# Patient Record
Sex: FEMALE | Race: WHITE | NOT HISPANIC OR LATINO | Employment: FULL TIME | URBAN - METROPOLITAN AREA
[De-identification: names, ages, dates, MRNs, and addresses within clinical notes are randomized per-mention and may not be internally consistent; named-entity substitution may affect disease eponyms.]

---

## 2017-02-14 ENCOUNTER — OFFICE VISIT (OUTPATIENT)
Dept: URGENT CARE | Facility: CLINIC | Age: 47
End: 2017-02-14
Payer: COMMERCIAL

## 2017-02-14 VITALS
SYSTOLIC BLOOD PRESSURE: 130 MMHG | BODY MASS INDEX: 40.65 KG/M2 | DIASTOLIC BLOOD PRESSURE: 84 MMHG | TEMPERATURE: 97.9 F | HEIGHT: 67 IN | HEART RATE: 80 BPM | WEIGHT: 259 LBS | OXYGEN SATURATION: 97 % | RESPIRATION RATE: 16 BRPM

## 2017-02-14 DIAGNOSIS — J06.9 UPPER RESPIRATORY TRACT INFECTION, UNSPECIFIED TYPE: ICD-10-CM

## 2017-02-14 PROCEDURE — 99203 OFFICE O/P NEW LOW 30 MIN: CPT | Performed by: PHYSICIAN ASSISTANT

## 2017-02-14 RX ORDER — DULOXETIN HYDROCHLORIDE 60 MG/1
60 CAPSULE, DELAYED RELEASE ORAL DAILY
COMMUNITY

## 2017-02-14 RX ORDER — CARVEDILOL 25 MG/1
25 TABLET ORAL 2 TIMES DAILY WITH MEALS
COMMUNITY

## 2017-02-14 RX ORDER — AZITHROMYCIN 250 MG/1
TABLET, FILM COATED ORAL
Qty: 6 TAB | Refills: 0 | Status: SHIPPED | OUTPATIENT
Start: 2017-02-14

## 2017-02-14 RX ORDER — BENZONATATE 100 MG/1
100 CAPSULE ORAL 3 TIMES DAILY PRN
Qty: 60 CAP | Refills: 0 | Status: SHIPPED | OUTPATIENT
Start: 2017-02-14

## 2017-02-14 RX ORDER — GLIPIZIDE 10 MG/1
10 TABLET ORAL 2 TIMES DAILY
COMMUNITY

## 2017-02-14 ASSESSMENT — ENCOUNTER SYMPTOMS
CHILLS: 0
FEVER: 0
SORE THROAT: 0
VOMITING: 0
SPUTUM PRODUCTION: 1
SHORTNESS OF BREATH: 0
NAUSEA: 0
MUSCULOSKELETAL NEGATIVE: 1
COUGH: 1
DIARRHEA: 0
DIZZINESS: 0
ABDOMINAL PAIN: 0
WHEEZING: 0

## 2017-02-14 ASSESSMENT — COPD QUESTIONNAIRES: COPD: 0

## 2017-02-14 NOTE — Clinical Note
February 14, 2017         Patient: Ena Donis   YOB: 1970   Date of Visit: 2/14/2017           To Whom it May Concern:    Ena Donis was seen in my clinic on 2/14/2017. Please excuse her absence from 2/13/17-2/15/17.    If you have any questions or concerns, please don't hesitate to call.        Sincerely,           Althea Jensen PA-C  Electronically Signed

## 2017-02-14 NOTE — MR AVS SNAPSHOT
"        Ena Donis   2017 4:00 PM   Office Visit   MRN: [id number]    Department:  [facility]   Dept Phone:  [phone]    Description:  Female : 1970   Provider:  Althea Jensen PA-C           Reason for Visit     Cough chest/nose congestion green mucus x2.5       Allergies as of 2017     Allergen Noted Reactions    Donnatal 2017       Phenobarbital 2017         You were diagnosed with     Upper respiratory tract infection, unspecified type   [[id number]]         Vital Signs     Blood Pressure Pulse Temperature Respirations Height Weight    130/84 mmHg 80 36.6 °C (97.9 °F) 16 1.702 m (5' 7\") 117.482 kg (259 lb)    Body Mass Index Oxygen Saturation                40.56 kg/m2 97%          Basic Information     Date Of Birth Sex Race Ethnicity Preferred Language    1970 Female Unable to Obtain Unknown English      Health Maintenance     Patient has no pending health maintenance at this time      Current Immunizations     No immunizations on file.      Below and/or attached are the medications your provider expects you to take. Review all of your home medications and newly ordered medications with your provider and/or pharmacist. Follow medication instructions as directed by your provider and/or pharmacist. Please keep your medication list with you and share with your provider. Update the information when medications are discontinued, doses are changed, or new medications (including over-the-counter products) are added; and carry medication information at all times in the event of emergency situations     Allergies:  DONNATAL - (reactions not documented)     PHENOBARBITAL - (reactions not documented)               Medications  Valid as of: 2017 -  5:17 PM    Generic Name Brand Name Tablet Size Instructions for use    Azithromycin (Tab) ZITHROMAX 250 MG Take as directed        Benzonatate (Cap) TESSALON 100 MG Take 1 Cap by mouth 3 times a day as " needed for Cough.        Canagliflozin (Tab) Canagliflozin 300 MG Take  by mouth.        Carvedilol (Tab) COREG 25 MG Take 25 mg by mouth 2 times a day, with meals.        DULoxetine HCl (Cap DR Particles) CYMBALTA 60 MG Take 60 mg by mouth every day.        GlipiZIDE (Tab) GLUCOTROL 10 MG Take 10 mg by mouth 2 times a day.        Hydrocod Polst-Chlorphen Polst (Suspension Extended Release) TUSSIONEX 10-8 MG/5ML Take 5 mL by mouth every 12 hours.        .                 Medicines prescribed today were sent to:     "[facility] [address] [place] [address], [place] - [address] [facility] [address]    [address] [facility] [place] [address]    Phone: [phone] Fax: [phone]    Open 24 Hours?: Yes      Medication refill instructions:       If your prescription bottle indicates you have medication refills left, it is not necessary to call your provider’s office. Please contact your pharmacy and they will refill your medication.    If your prescription bottle indicates you do not have any refills left, you may request refills at any time through one of the following ways: The online Cellwitch system (except Urgent Care), by calling your provider’s office, or by asking your pharmacy to contact your provider’s office with a refill request. Medication refills are processed only during regular business hours and may not be available until the next business day. Your provider may request additional information or to have a follow-up visit with you prior to refilling your medication.   *Please Note: Medication refills are assigned a new Rx number when refilled electronically. Your pharmacy may indicate that no refills were authorized even though a new prescription for the same medication is available at the pharmacy. Please request the medicine by name with the pharmacy before contacting your provider for a refill.           Cellwitch Access Code: [email or web address]  Expires: 3/16/2017  4:00 PM    Cellwitch  A secure, online  tool to manage your health information     [employer]’s DockPHP® is a secure, online tool that connects you to your personalized health information from the privacy of your home -- day or night - making it very easy for you to manage your healthcare. Once the activation process is completed, you can even access your medical information using the DockPHP babar, which is available for free in the Apple Babar store or Google Play store.     DockPHP provides the following levels of access (as shown below):   My Chart Features   [facility] Doctor [facility]  Specialists [facility]  Urgent  Care Non-[facility]  Primary Care  Doctor   Email your healthcare team securely and privately 24/7 X X X    Manage appointments: schedule your next appointment; view details of past/upcoming appointments X      Request prescription refills. X      View recent personal medical records, including lab and immunizations X X X X   View health record, including health history, allergies, medications X X X X   Read reports about your outpatient visits, procedures, consult and ER notes X X X X   See your discharge summary, which is a recap of your hospital and/or ER visit that includes your diagnosis, lab results, and care plan. X X       How to register for DockPHP:  1. Go to  [email or web address].  2. Click on the Sign Up Now box, which takes you to the New Member Sign Up page. You will need to provide the following information:  a. Enter your DockPHP Access Code exactly as it appears at the top of this page. (You will not need to use this code after you’ve completed the sign-up process. If you do not sign up before the expiration date, you must request a new code.)   b. Enter your date of birth.   c. Enter your home email address.   d. Click Submit, and follow the next screen’s instructions.  3. Create a DockPHP ID. This will be your DockPHP login ID and cannot be changed, so think of one that is secure and easy to remember.  4. Create a  Dekko password. You can change your password at any time.  5. Enter your Password Reset Question and Answer. This can be used at a later time if you forget your password.   6. Enter your e-mail address. This allows you to receive e-mail notifications when new information is available in Dekko.  7. Click Sign Up. You can now view your health information.    For assistance activating your Dekko account, call [phone]

## 2017-02-15 NOTE — PROGRESS NOTES
"Subjective:      Ena Donis is a 46 y.o. female who presents with Cough            Cough  This is a new problem. The current episode started 1 to 4 weeks ago (2.5 weeks). The problem has been gradually worsening. The problem occurs every few minutes. The cough is productive of sputum (yellow). Pertinent negatives include no chest pain, chills, ear pain, fever, sore throat, shortness of breath or wheezing. The symptoms are aggravated by lying down. She has tried OTC cough suppressant for the symptoms. The treatment provided mild relief. There is no history of asthma, COPD or pneumonia.       Review of Systems   Constitutional: Negative for fever and chills.   HENT: Negative for ear pain and sore throat.    Respiratory: Positive for cough and sputum production. Negative for shortness of breath and wheezing.    Cardiovascular: Negative for chest pain.   Gastrointestinal: Negative for nausea, vomiting, abdominal pain and diarrhea.   Genitourinary: Negative.    Musculoskeletal: Negative.    Neurological: Negative for dizziness.          Objective:     /84 mmHg  Pulse 80  Temp(Src) 36.6 °C (97.9 °F)  Resp 16  Ht 1.702 m (5' 7\")  Wt 117.482 kg (259 lb)  BMI 40.56 kg/m2  SpO2 97%     Physical Exam   Constitutional: She is oriented to person, place, and time. She appears well-developed and well-nourished. No distress.   HENT:   Head: Normocephalic and atraumatic.   Right Ear: Hearing, tympanic membrane, external ear and ear canal normal.   Left Ear: Hearing, tympanic membrane, external ear and ear canal normal.   Nose: Nose normal.   Mouth/Throat: Posterior oropharyngeal erythema present. No oropharyngeal exudate.   Oropharynx mildly erythematous without exudate bilaterally   Eyes: Conjunctivae are normal. Pupils are equal, round, and reactive to light. Right eye exhibits no discharge. Left eye exhibits no discharge.   Neck: Normal range of motion.   Cardiovascular: Normal rate, regular rhythm and " normal heart sounds.    No murmur heard.  Pulmonary/Chest: Effort normal and breath sounds normal. No respiratory distress. She has no wheezes. She has no rales.   Musculoskeletal: Normal range of motion.   Lymphadenopathy:     She has no cervical adenopathy.   Neurological: She is alert and oriented to person, place, and time.   Skin: Skin is warm and dry. She is not diaphoretic.   Psychiatric: She has a normal mood and affect. Her behavior is normal.   Nursing note and vitals reviewed.         PMH:  has no past medical history on file.  MEDS:   Current outpatient prescriptions:   •  carvedilol (COREG) 25 MG Tab, Take 25 mg by mouth 2 times a day, with meals., Disp: , Rfl:   •  Canagliflozin (INVOKANA) 300 MG Tab, Take  by mouth., Disp: , Rfl:   •  glipiZIDE (GLUCOTROL) 10 MG Tab, Take 10 mg by mouth 2 times a day., Disp: , Rfl:   •  duloxetine (CYMBALTA) 60 MG Cap DR Particles delayed-release capsule, Take 60 mg by mouth every day., Disp: , Rfl:   •  azithromycin (ZITHROMAX) 250 MG Tab, Take as directed, Disp: 6 Tab, Rfl: 0  •  benzonatate (TESSALON) 100 MG Cap, Take 1 Cap by mouth 3 times a day as needed for Cough., Disp: 60 Cap, Rfl: 0  •  Hydrocod Polst-CPM Polst ER (TUSSIONEX) 10-8 MG/5ML Suspension Extended Release, Take 5 mL by mouth every 12 hours., Disp: 140 mL, Rfl: 0  ALLERGIES:   Allergies   Allergen Reactions   • Donnatal    • Phenobarbital      SURGHX: History reviewed. No pertinent past surgical history.  SOCHX:    FH: family history is not on file.       Assessment/Plan:     1. Upper respiratory tract infection, unspecified type  - azithromycin (ZITHROMAX) 250 MG Tab; Take as directed  Dispense: 6 Tab; Refill: 0   - Complete full course of antibiotics as prescribed   - benzonatate (TESSALON) 100 MG Cap; Take 1 Cap by mouth 3 times a day as needed for Cough.  Dispense: 60 Cap; Refill: 0  - Hydrocod Polst-CPM Polst ER (TUSSIONEX) 10-8 MG/5ML Suspension Extended Release; Take 5 mL by mouth every 12  hours.  Dispense: 140 mL; Refill:    - Will cause sedation, avoid driving, operating heavy machinery, and drinking alcohol  - Increased fluids and rest  - Call or return to office if symptoms persist or worsen